# Patient Record
Sex: MALE | Race: WHITE
[De-identification: names, ages, dates, MRNs, and addresses within clinical notes are randomized per-mention and may not be internally consistent; named-entity substitution may affect disease eponyms.]

---
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
## 2021-03-26 NOTE — PHYS DOC
Adult General


[facility]


HPI





Patient is a [18-year-old male presenting to the emergency department for 

evaluation of abdominal pain nausea and vomiting that has been an off and on 

issue for the past 2 months.  Abdominal pain is epigastric and left upper 

quadrant sharp achy.  Emesis is nonbloody nonbilious and this episode started 

happening last night.  Patient has seen his primary care provider Dr. Dee and was prescribed oral Zofran but he has not been able to hold down 

pills.  Patient does not take any other medications.  He is retching with no 

blood noted in his emesis.  No fevers chills dysuria constipation diarrhea.





Review of Systems


Review of Systems





Constitutional: Denies fever or chills []


Respiratory: Denies cough or shortness of breath []


Cardiovascular: No additional information not addressed in HPI []


GI: + abdominal pain, nausea, vomiting.  No bloody stools or diarrhea []


: Denies dysuria or hematuria []


Musculoskeletal: Denies back pain or joint pain []





All other systems were reviewed and found to be within normal limits, except as 

documented in this note.





Current Medications


Current Medications





Current Medications








 Medications


  (Trade)  Dose


 Ordered  Sig/Juve  Start Time


 Stop Time Status Last Admin


Dose Admin


 


 Ondansetron HCl


  (Zofran Odt)  4 mg  STK-MED ONCE  2/6/18 01:30


 2/6/18 01:31 IA  


 











Allergies


Allergies





Allergies








Coded Allergies Type Severity Reaction Last Updated Verified


 


  No Known Drug Allergies    2/6/18 No











Physical Exam


Physical Exam





Constitutional: Well developed, well nourished, no acute distress, non-toxic 

appearance. []


HENT: Normocephalic, atraumatic, bilateral external ears normal, oropharynx 

moist, no oral exudates, nose normal. []


Eyes: PERRLA, EOMI, conjunctiva normal, no discharge. [] 


Neck: Normal range of motion, no tenderness, supple, no stridor. [] 


Cardiovascular:Heart rate regular rhythm, no murmur []


Lungs & Thorax:  Bilateral breath sounds clear to auscultation []


Abdomen: Bowel sounds normal, soft, positive epigastric and left upper quadrant 

tenderness, no rebound or guarding, no masses, no pulsatile masses. []





EKG


EKG


[]





Radiology/Procedures


Radiology/Procedures


CT scan of the abdomen and pelvis with contrast 2/6/2018


 


CLINICAL HISTORY: Left-sided abdominal pain with nausea and vomiting.


 


TECHNIQUE: After the intravenous administration of 75 cc of Omnipaque 300 


only, contiguous, 5 mm axial sections were obtained through the abdomen 


and pelvis. 


 


One or more of the following individualized dose reduction techniques were


utilized for this study:


 


1. Automated exposure control.


2. Adjustment of the mA and/or kV according to patient size.


3. Use of iterative reconstruction technique.


 


FINDINGS: Images through the lung bases are within normal limits.


 


The liver, spleen, pancreas, adrenal glands and right kidney are within 


normal limits.


 


Mild dilatation of the left intrarenal collecting system is seen. A 4 mm 


left UPJ calculus is seen which is causing mild obstruction of the left 


collecting system.


 


The abdominal aorta tapers normally. The gallbladder is well-distended. No


free fluid or free air is seen within the abdomen. There is no evidence of


bowel obstruction. Air and stool is seen throughout the colon.


 


Images through the pelvis demonstrate the urinary bladder to be 


contracted. No free fluid is seen. Mild S-shaped curvature of the 


thoracolumbar spine is noted.


 


IMPRESSION: 4 mm left UPJ calculus is seen which is causing mild 


obstruction of the left collecting system.


 


Electronically signed by: Salvatore Manrique MD (2/6/2018 3:00 AM) [id number]














DICTATED AND SIGNED BY:     SALVATORE MANRIQUE MD


DATE:     02/06/18 0249





Course & Med Decision Making


Course & Med Decision Making


Patient with nausea vomiting and abdominal pain possibly gastritis or an ulcer.

  We'll check labs urinalysis CT treat symptoms and reassess.





Surprisingly patient had hematuria on exam and after reviewing the CT scan he 

has a 4 mm left UPJ stone.  Patient is pain-free after treatment here.  Patient 

is in no obvious distress and can tolerate fluids by mouth with no difficulty 

so he'll be discharged in stable condition.  Patient will be prescribed Norco 

for pain and Zofran for nausea and vomiting and will be prescribed Flomax as 

well told to follow with urology and come back to the ED sooner with worsening 

pain fevers vomiting or other general concerns.  Patient and mother aware and 

agreeable with plan for discharge and verbalized understanding of the above 

instructions.





Dragon Disclaimer


Dragon Disclaimer


This electronic medical record was generated, in whole or in part, using a 

voice recognition dictation system.





Departure


Departure:


Impression:  


 Primary Impression:  


 Left ureteral calculus


Disposition:  01 HOME, SELF-CARE


Condition:  STABLE


Referrals:  


RONIT BARAJAS MD (PCP)


Patient Instructions:  Kidney Stones





Additional Instructions:  


DRINK PLENTY OF FLUIDS.  TAKE 400MG OF IBUPROFEN EVERY 6 HOURS AND THE NORCO 

FOR BREAKTHROUGH PAIN.  THE ZOFRAN ODT IS FOR NAUSEA.  FOLLOW WITH THE 

UROLOGIST AND COME BACK TO THE ED WITH WORSENING PAIN, FEVERS, VOMITING, OR 

OTHER GENERAL CONCERNS.





[facility] - [phone]


[facility]  -  [phone]


Scripts


Tamsulosin Hcl (FLOMAX) 0.4 Mg Cap.er.24h


0.4 MG PO QHS, #7 CAP.SR


   Prov: CHARLES BEJARANO DO         2/6/18 


Ondansetron (ZOFRAN ODT) 4 Mg Tab.rapdis


1 TAB SL Q8HRS, #10 TAB


   Prov: CHARLES BEJARANO DO         2/6/18 


Hydrocodone Bit/Acetaminophen (NORCO 5-325 TABLET) 1 Each Tablet


1 TAB PO PRN Q6HRS Y for PAIN, #20 TAB 0 Refills


   Prov: CHARLES BEJARANO DO         2/6/18











CHARLES BEJARANO DO Feb 6, 2018 01:38
- - -